# Patient Record
Sex: MALE | Race: WHITE
[De-identification: names, ages, dates, MRNs, and addresses within clinical notes are randomized per-mention and may not be internally consistent; named-entity substitution may affect disease eponyms.]

---

## 2017-02-09 ENCOUNTER — HOSPITAL ENCOUNTER (EMERGENCY)
Dept: HOSPITAL 62 - ER | Age: 27
Discharge: HOME | End: 2017-02-09
Payer: SELF-PAY

## 2017-02-09 VITALS — SYSTOLIC BLOOD PRESSURE: 119 MMHG | DIASTOLIC BLOOD PRESSURE: 69 MMHG

## 2017-02-09 DIAGNOSIS — L02.214: Primary | ICD-10-CM

## 2017-02-09 DIAGNOSIS — F17.210: ICD-10-CM

## 2017-02-09 PROCEDURE — 10061 I&D ABSCESS COMP/MULTIPLE: CPT

## 2017-02-09 PROCEDURE — 96372 THER/PROPH/DIAG INJ SC/IM: CPT

## 2017-02-09 PROCEDURE — 0H97XZZ DRAINAGE OF ABDOMEN SKIN, EXTERNAL APPROACH: ICD-10-PCS | Performed by: EMERGENCY MEDICINE

## 2017-02-09 PROCEDURE — 96374 THER/PROPH/DIAG INJ IV PUSH: CPT

## 2017-02-09 PROCEDURE — 99406 BEHAV CHNG SMOKING 3-10 MIN: CPT

## 2017-02-09 PROCEDURE — 99283 EMERGENCY DEPT VISIT LOW MDM: CPT

## 2017-02-09 NOTE — ER DOCUMENT REPORT
ED General





- General


Chief Complaint: Skin Problem


Stated Complaint: POSSIBLE INFECTION


Mode of Arrival: Ambulatory


Information source: Patient


Notes: 


27-year-old male presents with complaints of abscess of the suprapubic region 

of 4 day duration.  Patient denies any fevers or chills admits to redness and 

streaking denies any previous similar episodes


Patient does note drainage from the abscess


TRAVEL OUTSIDE OF THE [place] IN LAST 30 DAYS: No





- HPI


Onset: Other


Onset/Duration: Persistent, Worse


Quality of pain: Achy


Severity: Moderate


Pain Level: 2


Associated symptoms: Other


Exacerbated by: Denies


Relieved by: Denies


Similar symptoms previously: No


Recently seen / treated by doctor: No





- Related Data


Allergies/Adverse Reactions: 


 





No Known Allergies Allergy (Verified 02/09/17 04:17)


 











Past Medical History





- Social History


Smoking Status: Current Every Day Smoker


Cigarette use (# per day): Yes


Chew tobacco use (# tins/day): No


Smoking Education Provided: Yes - Patient counselled regarding cessation for 4 

minutes


Frequency of alcohol use: Occasional


Drug Abuse: None


Family History: Reviewed & Not Pertinent


Patient has suicidal ideation: No


Patient has homicidal ideation: No


Pulmonary Medical History: Reports: Hx Bronchitis, Hx COPD


Renal/ Medical History: Denies: Hx Peritoneal Dialysis


Past Surgical History: Reports: Hx Tonsillectomy





- Immunizations


Hx Diphtheria, Pertussis, Tetanus Vaccination: Yes





Review of Systems





- Review of Systems


Notes: 








REVIEW OF SYSTEMS:


CONSTITUTIONAL :  Denies fever,  chills, or sweats.  Denies recent illness.


EENT:   Denies eye, ear, throat, or mouth pain or symptoms.  Denies nasal or 

sinus congestion or discharge.  Denies throat, tongue, or mouth swelling or 

difficulty swallowing.


CARDIOVASCULAR:  Denies chest pain.  Denies palpitations or racing or irregular 

heart beat.  Denies ankle edema.


RESPIRATORY:  Denies cough, cold, or chest congestion.  Denies shortness of 

breath, difficulty breathing, or wheezing.


GASTROINTESTINAL:  Denies abdominal pain or distention.  Denies nausea, vomiting

, or diarrhea.  Denies blood in vomitus, stools, or per rectum.  Denies black, 

tarry stools.  Denies constipation.  


GENITOURINARY:  Denies difficulty urinating, painful urination, burning, 

frequency, blood in urine, or discharge.


MUSCULOSKELETAL:  Denies back or neck pain or stiffness.  Denies joint pain or 

swelling.


SKIN: Abscess


HEMATOLOGIC :   Denies easy bruising or bleeding.


LYMPHATIC:  Denies swollen, enlarged glands.


NEUROLOGICAL:  Denies confusion or altered mental status.  Denies passing out 

or loss of consciousness.  Denies dizziness or lightheadedness.  Denies 

headache.  Denies weakness or paralysis or loss of use of either side.  Denies 

problems with gait or speech.  Denies sensory loss, numbness, or tingling.  

Denies seizures.


PSYCHIATRIC:  Denies anxiety or stress.  Denies depression, suicidal ideation, 

or homicidal ideation.





ALL OTHER SYSTEMS REVIEWED AND NEGATIVE.





Dictation was performed using Dragon voice recognition software 











PHYSICAL EXAMINATION:





GENERAL: Well-appearing, well-nourished and in no acute distress.





HEAD: Atraumatic, normocephalic.





EYES: Pupils equal round and reactive to light, extraocular movements intact, 

sclera anicteric, conjunctiva are normal.





ENT: Nares patent, oropharynx clear without exudates.  Moist mucous membranes.





NECK: Normal range of motion, supple without lymphadenopathy





LUNGS: Breath sounds clear to auscultation bilaterally and equal.  No wheezes 

rales or rhonchi.





HEART: Regular rate and rhythm without murmurs





ABDOMEN: Soft, nontender, nondistended abdomen.  No guarding, no rebound.  No 

masses appreciated.





Musculoskeletal: Normal range of motion, no pitting or edema.  No cyanosis.





NEUROLOGICAL: Cranial nerves grossly intact.  Normal speech, normal gait.  

Normal sensory, motor exams 





PSYCH: Normal mood, normal affect.





SKIN: Suprapubic abscess noted measuring 6 x 7 cm with fluctuance erythema 

there is dried pus noted





Physical Exam





- Vital signs


Vitals: 


 











Temp Pulse Resp BP


 


 97.9 F   102 H  20   111/60 


 


 02/09/17 04:08  02/09/17 04:08  02/09/17 04:08  02/09/17 04:08














Course





- Re-evaluation


Re-evalutation: 


02/09/17 06:42


Patient was given pain control will have abscess incised and drained and will 

be started on antibiotics





02/09/17 07:08


Pt was incised, pus drained and packing placed, pt given antibiotics and very 

close return precautions . pt states he understands and will do so


smoiking cessation provided as well











After performing a Medical Screening Examination, I estimate there is LOW risk 

for OPEN FRACTURE, COMPARTMENT SYNDROME, TENDON RUPTURE, ACUTE NEUROVASCULAR 

INJURY, or RETAINED FOREIGN BODY, thus I consider the discharge disposition 

reasonable. Also, there is no evidence or peritonitis, sepsis, or toxicity. The 

patient and I have discussed the diagnosis and risks, and we agree with 

discharging home with close follow-up with the understanding that symptoms and 

presentations can change. We also discussed returning to the Emergency 

Department immediately if new or worsening symptoms occur. We have discussed 

the symptoms which are most concerning (e.g., changing or worsening pain, fever

, numbness, weakness, cool or painful digits) that necessitate immediate return.





- Vital Signs


Vital signs: 


 











Temp Pulse Resp BP Pulse Ox


 


 97.9 F   102 H  20   111/60   99 


 


 02/09/17 04:12  02/09/17 04:12  02/09/17 04:12  02/09/17 04:12  02/09/17 04:12














Procedures





- Incision and Drainage


  ** Mid- Groin


Time completed: 07:11


Type: Complex


Anesthetic type: 1% Lidocaine


mL's of anesthetic: 10


I&D procedure: Chlorprep applied, Sterile dressing applied


Incision Method: Incision made by scalpel


Amount/type of drainage: large amount of thick pus drained 





Discharge





- Discharge


Clinical Impression: 


 Encounter for smoking cessation counseling, Suprapubic abscess





Condition: Stable


Disposition: HOME, SELF-CARE


Instructions:  Abscess ([facility]), Post Incision and Drainage


Additional Instructions: 


Return for recheck in 48 hours or immediately if there is any worsening symptoms


Prescriptions: 


Cephalexin Monohydrate [Keflex 500 mg Capsule] 500 mg PO QID #40 capsule


Oxycodone HCl/Acetaminophen [Percocet 5-325 mg Tablet] 1 - 2 tab PO Q4H PRN #15 

tablet


 PRN Reason: 


Sulfamethoxazole/Trimethoprim [Bactrim Ds Tablet] 2 each PO BID #40 tablet

## 2017-10-22 ENCOUNTER — HOSPITAL ENCOUNTER (EMERGENCY)
Dept: HOSPITAL 62 - ER | Age: 27
Discharge: HOME | End: 2017-10-22
Payer: SELF-PAY

## 2017-10-22 VITALS — SYSTOLIC BLOOD PRESSURE: 136 MMHG | DIASTOLIC BLOOD PRESSURE: 85 MMHG

## 2017-10-22 DIAGNOSIS — R59.1: Primary | ICD-10-CM

## 2017-10-22 DIAGNOSIS — F17.200: ICD-10-CM

## 2017-10-22 DIAGNOSIS — J44.9: ICD-10-CM

## 2017-10-22 LAB
APPEARANCE UR: CLEAR
BILIRUB UR QL STRIP: NEGATIVE
CHLAM PCR: NOT DETECTED
GLUCOSE UR STRIP-MCNC: NEGATIVE MG/DL
KETONES UR STRIP-MCNC: NEGATIVE MG/DL
NITRITE UR QL STRIP: NEGATIVE
PH UR STRIP: 6 [PH] (ref 5–9)
PROT UR STRIP-MCNC: NEGATIVE MG/DL
SP GR UR STRIP: 1.01
UROBILINOGEN UR-MCNC: NEGATIVE MG/DL (ref ?–2)

## 2017-10-22 PROCEDURE — 76870 US EXAM SCROTUM: CPT

## 2017-10-22 PROCEDURE — 99284 EMERGENCY DEPT VISIT MOD MDM: CPT

## 2017-10-22 PROCEDURE — 93976 VASCULAR STUDY: CPT

## 2017-10-22 PROCEDURE — 81001 URINALYSIS AUTO W/SCOPE: CPT

## 2017-10-22 PROCEDURE — 87591 N.GONORRHOEAE DNA AMP PROB: CPT

## 2017-10-22 PROCEDURE — 87491 CHLMYD TRACH DNA AMP PROBE: CPT

## 2017-10-22 NOTE — RADIOLOGY REPORT (SQ)
EXAM DESCRIPTION:  U/S SCROTUM W/DOPPLER



COMPLETED DATE/TIME:  10/22/2017 11:03 am



REASON FOR STUDY:  right groin, testicle tenderness



COMPARISON:  None.



TECHNIQUE:  Static and realtime gray scale imaging of the scrotum and testes.  Selected color Doppler
 and spectral images recorded to document blood flow.



LIMITATIONS:  None.



FINDINGS:  RIGHT:

TESTICLE: Normal size, 3.6 x 2.7 x 1.5 cm in size. Normal echotexture.  Normal blood flow.  No mass.

EPIDIDYMIS: Normal.

HYDROCELE OR VARICOCELE: No.

HERNIA OR EXTRA-TESTICULAR MASS: No.

OTHER: Incidental finding of a right inguinal lymph node without worrisome features.  Normal central 
hilar fat and cortical thickness, node measures 1 x 0.6 x 0.6 cm in size.

LEFT:

TESTICLE: Normal size, 3.5 x 3 x 2 cm. Normal echotexture.  Normal blood flow.  No mass.

EPIDIDYMIS: Normal.

HYDROCELE OR VARICOCELE: Small left hydrocele

HERNIA OR EXTRA-TESTICULAR MASS: No.

OTHER: No other significant finding.



IMPRESSION:  NO EVIDENCE OF TESTICULAR MASS OR TORSION.

Small left hydrocele

Incidental finding of a 1 cm right inguinal lymph node



TECHNICAL DOCUMENTATION:  JOB ID:  [id number]

 2011 [employer]- All Rights Reserved
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
## 2018-03-16 ENCOUNTER — HOSPITAL ENCOUNTER (EMERGENCY)
Dept: HOSPITAL 62 - ER | Age: 28
Discharge: LEFT BEFORE DISCHARGE | End: 2018-03-16
Payer: COMMERCIAL

## 2018-03-16 VITALS — DIASTOLIC BLOOD PRESSURE: 75 MMHG | SYSTOLIC BLOOD PRESSURE: 134 MMHG
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
## 2018-03-16 DIAGNOSIS — S69.91XA: ICD-10-CM

## 2018-03-16 DIAGNOSIS — Z53.21: Primary | ICD-10-CM

## 2018-03-16 PROCEDURE — 93010 ELECTROCARDIOGRAM REPORT: CPT

## 2018-03-16 PROCEDURE — 93005 ELECTROCARDIOGRAM TRACING: CPT

## 2018-03-16 PROCEDURE — 99284 EMERGENCY DEPT VISIT MOD MDM: CPT
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
## 2020-03-11 ENCOUNTER — HOSPITAL ENCOUNTER (EMERGENCY)
Dept: HOSPITAL 62 - ER | Age: 30
Discharge: HOME | End: 2020-03-11
Payer: SELF-PAY

## 2020-03-11 VITALS — DIASTOLIC BLOOD PRESSURE: 65 MMHG | SYSTOLIC BLOOD PRESSURE: 128 MMHG

## 2020-03-11 DIAGNOSIS — F17.200: ICD-10-CM

## 2020-03-11 DIAGNOSIS — R55: Primary | ICD-10-CM

## 2020-03-11 LAB
ADD MANUAL DIFF: NO
ADD MANUAL MICROSCOPIC: YES
ALBUMIN SERPL-MCNC: 4.2 G/DL (ref 3.5–5)
ALP SERPL-CCNC: 60 U/L (ref 38–126)
ANION GAP SERPL CALC-SCNC: 10 MMOL/L (ref 5–19)
APPEARANCE UR: CLEAR
APTT PPP: YELLOW S
AST SERPL-CCNC: 19 U/L (ref 17–59)
BARBITURATES UR QL SCN: NEGATIVE
BASOPHILS # BLD AUTO: 0.1 10^3/UL (ref 0–0.2)
BASOPHILS NFR BLD AUTO: 1.4 % (ref 0–2)
BILIRUB DIRECT SERPL-MCNC: 0.2 MG/DL (ref 0–0.4)
BILIRUB SERPL-MCNC: 0.5 MG/DL (ref 0.2–1.3)
BILIRUB UR QL STRIP: NEGATIVE
BUN SERPL-MCNC: 11 MG/DL (ref 7–20)
CALCIUM: 9.4 MG/DL (ref 8.4–10.2)
CHLORIDE SERPL-SCNC: 103 MMOL/L (ref 98–107)
CK MB SERPL-MCNC: 0.48 NG/ML (ref ?–4.55)
CK SERPL-CCNC: 28 U/L (ref 55–170)
CO2 SERPL-SCNC: 28 MMOL/L (ref 22–30)
EOSINOPHIL # BLD AUTO: 0.6 10^3/UL (ref 0–0.6)
EOSINOPHIL NFR BLD AUTO: 10.4 % (ref 0–6)
ERYTHROCYTE [DISTWIDTH] IN BLOOD BY AUTOMATED COUNT: 14.3 % (ref 11.5–14)
GLUCOSE SERPL-MCNC: 124 MG/DL (ref 75–110)
GLUCOSE UR STRIP-MCNC: NEGATIVE MG/DL
HCT VFR BLD CALC: 43.7 % (ref 37.9–51)
HGB BLD-MCNC: 15.1 G/DL (ref 13.5–17)
HYALINE CASTS #/AREA URNS LPF: (no result) /LPF
KETONES UR STRIP-MCNC: NEGATIVE MG/DL
LYMPHOCYTES # BLD AUTO: 1.9 10^3/UL (ref 0.5–4.7)
LYMPHOCYTES NFR BLD AUTO: 34.6 % (ref 13–45)
MCH RBC QN AUTO: 31 PG (ref 27–33.4)
MCHC RBC AUTO-ENTMCNC: 34.5 G/DL (ref 32–36)
MCV RBC AUTO: 90 FL (ref 80–97)
METHADONE UR QL SCN: NEGATIVE
MONOCYTES # BLD AUTO: 0.5 10^3/UL (ref 0.1–1.4)
MONOCYTES NFR BLD AUTO: 9.4 % (ref 3–13)
NEUTROPHILS # BLD AUTO: 2.4 10^3/UL (ref 1.7–8.2)
NEUTS SEG NFR BLD AUTO: 44.2 % (ref 42–78)
NITRITE UR QL STRIP: NEGATIVE
PCP UR QL SCN: NEGATIVE
PH UR STRIP: 7 [PH] (ref 5–9)
PLATELET # BLD: 329 10^3/UL (ref 150–450)
POTASSIUM SERPL-SCNC: 4.9 MMOL/L (ref 3.6–5)
PROT SERPL-MCNC: 7.2 G/DL (ref 6.3–8.2)
PROT UR STRIP-MCNC: NEGATIVE MG/DL
RBC # BLD AUTO: 4.87 10^6/UL (ref 4.35–5.55)
RBC #/AREA URNS HPF: (no result) /HPF
SP GR UR STRIP: 1.01
TOTAL CELLS COUNTED % (AUTO): 100 %
TROPONIN I SERPL-MCNC: < 0.012 NG/ML
URINE AMPHETAMINES SCREEN: NEGATIVE
URINE BENZODIAZEPINES SCREEN: NEGATIVE
URINE COCAINE SCREEN: NEGATIVE
URINE MARIJUANA (THC) SCREEN: NEGATIVE
UROBILINOGEN UR-MCNC: NEGATIVE MG/DL (ref ?–2)
WBC # BLD AUTO: 5.4 10^3/UL (ref 4–10.5)
WBC #/AREA URNS HPF: (no result) /HPF

## 2020-03-11 PROCEDURE — 93010 ELECTROCARDIOGRAM REPORT: CPT

## 2020-03-11 PROCEDURE — 82550 ASSAY OF CK (CPK): CPT

## 2020-03-11 PROCEDURE — 85025 COMPLETE CBC W/AUTO DIFF WBC: CPT

## 2020-03-11 PROCEDURE — 82553 CREATINE MB FRACTION: CPT

## 2020-03-11 PROCEDURE — 93005 ELECTROCARDIOGRAM TRACING: CPT

## 2020-03-11 PROCEDURE — 80053 COMPREHEN METABOLIC PANEL: CPT

## 2020-03-11 PROCEDURE — 99284 EMERGENCY DEPT VISIT MOD MDM: CPT

## 2020-03-11 PROCEDURE — 80307 DRUG TEST PRSMV CHEM ANLYZR: CPT

## 2020-03-11 PROCEDURE — 81001 URINALYSIS AUTO W/SCOPE: CPT

## 2020-03-11 PROCEDURE — 36415 COLL VENOUS BLD VENIPUNCTURE: CPT

## 2020-03-11 PROCEDURE — 84484 ASSAY OF TROPONIN QUANT: CPT
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
